# Patient Record
Sex: MALE | Race: WHITE | NOT HISPANIC OR LATINO | Employment: OTHER | URBAN - METROPOLITAN AREA
[De-identification: names, ages, dates, MRNs, and addresses within clinical notes are randomized per-mention and may not be internally consistent; named-entity substitution may affect disease eponyms.]

---

## 2024-02-15 ENCOUNTER — OFFICE VISIT (OUTPATIENT)
Dept: URGENT CARE | Facility: CLINIC | Age: 59
End: 2024-02-15
Payer: COMMERCIAL

## 2024-02-15 VITALS
SYSTOLIC BLOOD PRESSURE: 132 MMHG | RESPIRATION RATE: 17 BRPM | BODY MASS INDEX: 49.69 KG/M2 | TEMPERATURE: 102 F | OXYGEN SATURATION: 97 % | DIASTOLIC BLOOD PRESSURE: 79 MMHG | HEIGHT: 62 IN | HEART RATE: 93 BPM | WEIGHT: 270 LBS

## 2024-02-15 DIAGNOSIS — U07.1 COVID-19: Primary | ICD-10-CM

## 2024-02-15 DIAGNOSIS — U07.1 COVID-19 VIRUS DETECTED: ICD-10-CM

## 2024-02-15 DIAGNOSIS — R50.9 FEVER, UNSPECIFIED FEVER CAUSE: ICD-10-CM

## 2024-02-15 LAB
CTP QC/QA: YES
CTP QC/QA: YES
POC MOLECULAR INFLUENZA A AGN: NEGATIVE
POC MOLECULAR INFLUENZA B AGN: NEGATIVE
SARS-COV-2 RDRP RESP QL NAA+PROBE: POSITIVE

## 2024-02-15 PROCEDURE — 96372 THER/PROPH/DIAG INJ SC/IM: CPT | Mod: ,,,

## 2024-02-15 PROCEDURE — 87502 INFLUENZA DNA AMP PROBE: CPT | Mod: QW,,,

## 2024-02-15 PROCEDURE — 87635 SARS-COV-2 COVID-19 AMP PRB: CPT | Mod: QW,,,

## 2024-02-15 PROCEDURE — 99204 OFFICE O/P NEW MOD 45 MIN: CPT | Mod: 25,,,

## 2024-02-15 RX ORDER — ACETAMINOPHEN 500 MG
1000 TABLET ORAL
Status: COMPLETED | OUTPATIENT
Start: 2024-02-15 | End: 2024-02-15

## 2024-02-15 RX ORDER — BETAMETHASONE SODIUM PHOSPHATE AND BETAMETHASONE ACETATE 3; 3 MG/ML; MG/ML
9 INJECTION, SUSPENSION INTRA-ARTICULAR; INTRALESIONAL; INTRAMUSCULAR; SOFT TISSUE
Status: COMPLETED | OUTPATIENT
Start: 2024-02-15 | End: 2024-02-15

## 2024-02-15 RX ORDER — NIRMATRELVIR AND RITONAVIR 300-100 MG
KIT ORAL
Qty: 30 TABLET | Refills: 0 | Status: SHIPPED | OUTPATIENT
Start: 2024-02-15 | End: 2024-02-20

## 2024-02-15 RX ADMIN — Medication 1000 MG: at 05:02

## 2024-02-15 RX ADMIN — BETAMETHASONE SODIUM PHOSPHATE AND BETAMETHASONE ACETATE 9 MG: 3; 3 INJECTION, SUSPENSION INTRA-ARTICULAR; INTRALESIONAL; INTRAMUSCULAR; SOFT TISSUE at 05:02

## 2024-02-15 NOTE — PROGRESS NOTES
"Subjective:      Patient ID: Nickolas Goodrich is a 58 y.o. male.    Vitals:  height is 5' 2" (1.575 m) and weight is 122.5 kg (270 lb). His temperature is 102.2 °F (39 °C) (abnormal). His blood pressure is 132/79 and his pulse is 93. His respiration is 17 and oxygen saturation is 97%.     Chief Complaint: Fever ( Patient is a 58 y.o. male who presents to urgent care with complaints of fever 102.2, congestion, cough, headache, fatigue, body aches, exposed to covid that started today at noon.)    Patient is a 58-year-old male that presents complaining of fever, cough, congestion, headache, fatigue and body aches that started around lunchtime today.  He admits to COVID exposure by wife and daughter. Patient denies any SOB, CP, rash, n/v/d, or neck stiffness.      Fever   Associated symptoms include congestion, coughing and headaches.       Constitution: Positive for fatigue and fever.   HENT:  Positive for congestion.    Respiratory:  Positive for cough.    Neurological:  Positive for headaches.      Objective:     Physical Exam   Constitutional: He is oriented to person, place, and time. He appears well-developed. He is cooperative.  Non-toxic appearance. He appears ill. No distress.   HENT:   Head: Normocephalic and atraumatic.   Ears:   Right Ear: Hearing, tympanic membrane, external ear and ear canal normal.   Left Ear: Hearing, tympanic membrane, external ear and ear canal normal.   Nose: Rhinorrhea and congestion present.   Mouth/Throat: Uvula is midline and mucous membranes are normal. Mucous membranes are moist. No trismus in the jaw. Normal dentition. No uvula swelling. No oropharyngeal exudate, posterior oropharyngeal edema or posterior oropharyngeal erythema. Oropharynx is clear.   Eyes: Conjunctivae and lids are normal. Right conjunctiva is not injected. Left conjunctiva is not injected.   Neck: Neck supple. No edema present. No erythema present. No neck rigidity present.   Cardiovascular: Normal rate, " "regular rhythm, normal heart sounds and normal pulses.   Pulmonary/Chest: Effort normal and breath sounds normal. No respiratory distress. He has no decreased breath sounds. He has no rhonchi.   Abdominal: Bowel sounds are normal. There is no abdominal tenderness.   Neurological: He is alert and oriented to person, place, and time. He exhibits normal muscle tone. Coordination normal.   Skin: Skin is warm, intact, not diaphoretic and no rash.   Psychiatric: His speech is normal and behavior is normal. Mood, judgment and thought content normal.   Nursing note and vitals reviewed.      Assessment:     1. COVID-19    2. Fever, unspecified fever cause           Previous History      Review of patient's allergies indicates:  No Known Allergies    History reviewed. No pertinent past medical history.  Current Outpatient Medications   Medication Instructions    nirmatrelvir-ritonavir (PAXLOVID) 300 mg (150 mg x 2)-100 mg copackaged tablets (EUA) Take 3 tablets by mouth 2 (two) times daily. Each dose contains 2 nirmatrelvir (pink tablets) and 1 ritonavir (white tablet). Take all 3 tablets together     Past Surgical History:   Procedure Laterality Date    HERNIA REPAIR       Family History   Problem Relation Age of Onset    No Known Problems Mother     No Known Problems Father        Social History     Tobacco Use    Smoking status: Never     Passive exposure: Never    Smokeless tobacco: Never   Substance Use Topics    Alcohol use: Never    Drug use: Never        Physical Exam      Vital Signs Reviewed   /79   Pulse 93   Temp (!) 102.2 °F (39 °C)   Resp 17   Ht 5' 2" (1.575 m)   Wt 122.5 kg (270 lb)   SpO2 97%   BMI 49.38 kg/m²        Procedures    Procedures     Labs     Results for orders placed or performed in visit on 02/15/24   POCT COVID-19 Rapid Screening   Result Value Ref Range    POC Rapid COVID Positive (A) Negative     Acceptable Yes    POCT Influenza A/B Molecular   Result Value Ref " Range    POC Molecular Influenza A Ag Negative Negative, Not Reported    POC Molecular Influenza B Ag Negative Negative, Not Reported     Acceptable Yes       Plan:       COVID-19  -     betamethasone acetate-betamethasone sodium phosphate injection 9 mg  -     nirmatrelvir-ritonavir (PAXLOVID) 300 mg (150 mg x 2)-100 mg copackaged tablets (EUA); Take 3 tablets by mouth 2 (two) times daily. Each dose contains 2 nirmatrelvir (pink tablets) and 1 ritonavir (white tablet). Take all 3 tablets together  Dispense: 30 tablet; Refill: 0    Fever, unspecified fever cause  -     POCT COVID-19 Rapid Screening  -     POCT Influenza A/B Molecular  -     acetaminophen tablet 1,000 mg    COVID-Take Tylenol (acetaminophen) every 4 hours and/or Motrin every 6 for fever/aches.  Drink plenty of fluids.     Paxlovid to help with symptom duration/severity    Take OTC Decongestants  (Claritin D/sudafed OR Coricidin for people with HTN) to cut down on the fluid in the lining of your nose and relieve swollen nasal passages and congestion. May also use cough/cold/congestion medication such as Dayquil/Nyquil and/or antihistamine such as Claritin/Zyrtec/Allegra.  Cepacol sore throat lozenges/spray if needed.     Drink plenty of fluids.  Rest.     Go directly to ER if you experience any SOB, chest pain, or other worrisome symptoms.      If positive for Covid-19, you should stay in isolation until: 1) At least 5 days have passed since your symptoms first appeared and  2) At least 24 hours have passed since recovery defined as resolution of fever without the use of fever-reducing medications and improvement in symptoms

## 2024-02-15 NOTE — PATIENT INSTRUCTIONS
COVID-Take Tylenol (acetaminophen) every 4 hours and/or Motrin every 6 for fever/aches.  Drink plenty of fluids.     Paxlovid to help with symptom duration/severity    Take OTC Decongestants  (Claritin D/sudafed OR Coricidin for people with HTN) to cut down on the fluid in the lining of your nose and relieve swollen nasal passages and congestion. May also use cough/cold/congestion medication such as Dayquil/Nyquil and/or antihistamine such as Claritin/Zyrtec/Allegra.  Cepacol sore throat lozenges/spray if needed.     Drink plenty of fluids.  Rest.     Go directly to ER if you experience any SOB, chest pain, or other worrisome symptoms.      If positive for Covid-19, you should stay in isolation until: 1) At least 5 days have passed since your symptoms first appeared and  2) At least 24 hours have passed since recovery defined as resolution of fever without the use of fever-reducing medications and improvement in symptoms